# Patient Record
(demographics unavailable — no encounter records)

---

## 2024-10-22 NOTE — PROCEDURE
[de-identified] :   Euflexxa injection # 1 - Bilateral knee joints Lot #: [id number] Exp: 05- Man: Arina NDC: [id number]

## 2024-10-29 NOTE — PROCEDURE
[de-identified] :   Euflexxa injection # 2 - Bilateral knee joints Lot #: [id number] Exp: 05- Man: Arina NDC: [id number]

## 2024-11-12 NOTE — PROCEDURE
[de-identified] : Julianna presents today complete a series of Euflexxa injections in both knees. 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 She had Euflexxa in April 2024. her pain level went from an 8/10 to a 1/10. She was walking longer distances without pain or swelling. She had greater ease navigating stairs. She now reports a two week h.o increased pain (5/10). She has had more difficutly with stair climbing. She has started taking Tylenol for pain. She has modified her walking due to pain. She denies any swelling, locking or buckling.  cc: bilateral knee pain and stiffness DX: biltateral knee DJD   Under strict sterile technique, both knees were prepped with Chloraprep. Using the superolateral approach, with the patient supine, a 2mL injection of Euflexxa was administered intra-articularly into each knee. The patient tolerated the procedure well. The patient was instructed to avoid vigorous exercise for 24 hours and will apply ice to the knee for 20 minutes 2-3 times per day if discomfort occurs. Patient will return on an as needed basis. The patient will call if any questions or problems should arise.  Euflexxa injection # 3 - Bilateral knee joints Lot #: [id number] Exp: 05- Man: Arina NDC: [id number].

## 2024-11-12 NOTE — PROCEDURE
[de-identified] : Julianna presents today complete a series of Euflexxa injections in both knees. 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 She had Euflexxa in April 2024. her pain level went from an 8/10 to a 1/10. She was walking longer distances without pain or swelling. She had greater ease navigating stairs. She now reports a two week h.o increased pain (5/10). She has had more difficutly with stair climbing. She has started taking Tylenol for pain. She has modified her walking due to pain. She denies any swelling, locking or buckling.  cc: bilateral knee pain and stiffness DX: biltateral knee DJD   Under strict sterile technique, both knees were prepped with Chloraprep. Using the superolateral approach, with the patient supine, a 2mL injection of Euflexxa was administered intra-articularly into each knee. The patient tolerated the procedure well. The patient was instructed to avoid vigorous exercise for 24 hours and will apply ice to the knee for 20 minutes 2-3 times per day if discomfort occurs. Patient will return on an as needed basis. The patient will call if any questions or problems should arise.  Euflexxa injection # 3 - Bilateral knee joints Lot #: [id number] Exp: 05- Man: Arina NDC: [id number].

## 2025-05-16 NOTE — PROCEDURE
[de-identified] : Julianna presents today for cortisone injections in both knees. 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 She has managed well in the past with cortisone and HA injections. She is going on a walking trip to [place] and presents today for cortisone injection   CC: bilateral knee pain and stiffness DX: bilateral knee DJD   Under strict sterile technique, both knees were  prepped with Chloraprep Using the superolateral approach, with the patient supine, 1ml of Kenalog was mixed with 5mls of 1% Lidocaine and 5mLs of 0.5% Marcaine, and was injected intraarticularly into each knee . The patient tolerated the procedure well. The patient was instructed to avoid vigorous exercise for 24 hours and will apply ice to the knee for 20 minutes 2-3 times per day if discomfort occurs. Patient will return on an as needed basis. The patient will call if any questions or problems should arise  Injections were administered by ANISH Sunshine